# Patient Record
Sex: MALE | Race: WHITE | NOT HISPANIC OR LATINO | Employment: FULL TIME | URBAN - METROPOLITAN AREA
[De-identification: names, ages, dates, MRNs, and addresses within clinical notes are randomized per-mention and may not be internally consistent; named-entity substitution may affect disease eponyms.]

---

## 2023-07-17 ENCOUNTER — HOSPITAL ENCOUNTER (EMERGENCY)
Facility: MEDICAL CENTER | Age: 23
End: 2023-07-17
Attending: EMERGENCY MEDICINE

## 2023-07-17 VITALS
DIASTOLIC BLOOD PRESSURE: 59 MMHG | BODY MASS INDEX: 21.14 KG/M2 | HEIGHT: 75 IN | HEART RATE: 75 BPM | OXYGEN SATURATION: 96 % | RESPIRATION RATE: 20 BRPM | SYSTOLIC BLOOD PRESSURE: 112 MMHG | WEIGHT: 170 LBS | TEMPERATURE: 98 F

## 2023-07-17 DIAGNOSIS — R55 NEAR SYNCOPE: ICD-10-CM

## 2023-07-17 DIAGNOSIS — E87.6 HYPOKALEMIA: ICD-10-CM

## 2023-07-17 DIAGNOSIS — R42 LIGHTHEADEDNESS: ICD-10-CM

## 2023-07-17 DIAGNOSIS — E86.0 DEHYDRATION: ICD-10-CM

## 2023-07-17 LAB
ALBUMIN SERPL BCP-MCNC: 4.6 G/DL (ref 3.2–4.9)
ALBUMIN/GLOB SERPL: 2.3 G/DL
ALP SERPL-CCNC: 57 U/L (ref 30–99)
ALT SERPL-CCNC: 14 U/L (ref 2–50)
ANION GAP SERPL CALC-SCNC: 12 MMOL/L (ref 7–16)
AST SERPL-CCNC: 16 U/L (ref 12–45)
BASOPHILS # BLD AUTO: 0.5 % (ref 0–1.8)
BASOPHILS # BLD: 0.04 K/UL (ref 0–0.12)
BILIRUB SERPL-MCNC: 0.9 MG/DL (ref 0.1–1.5)
BUN SERPL-MCNC: 13 MG/DL (ref 8–22)
CALCIUM ALBUM COR SERPL-MCNC: 8.8 MG/DL (ref 8.5–10.5)
CALCIUM SERPL-MCNC: 9.3 MG/DL (ref 8.5–10.5)
CHLORIDE SERPL-SCNC: 105 MMOL/L (ref 96–112)
CO2 SERPL-SCNC: 22 MMOL/L (ref 20–33)
CREAT SERPL-MCNC: 1.13 MG/DL (ref 0.5–1.4)
EOSINOPHIL # BLD AUTO: 0.11 K/UL (ref 0–0.51)
EOSINOPHIL NFR BLD: 1.4 % (ref 0–6.9)
ERYTHROCYTE [DISTWIDTH] IN BLOOD BY AUTOMATED COUNT: 40.2 FL (ref 35.9–50)
GFR SERPLBLD CREATININE-BSD FMLA CKD-EPI: 93 ML/MIN/1.73 M 2
GLOBULIN SER CALC-MCNC: 2 G/DL (ref 1.9–3.5)
GLUCOSE SERPL-MCNC: 154 MG/DL (ref 65–99)
HCT VFR BLD AUTO: 40 % (ref 42–52)
HGB BLD-MCNC: 13.9 G/DL (ref 14–18)
IMM GRANULOCYTES # BLD AUTO: 0.02 K/UL (ref 0–0.11)
IMM GRANULOCYTES NFR BLD AUTO: 0.3 % (ref 0–0.9)
LYMPHOCYTES # BLD AUTO: 3.47 K/UL (ref 1–4.8)
LYMPHOCYTES NFR BLD: 44.6 % (ref 22–41)
MCH RBC QN AUTO: 29.6 PG (ref 27–33)
MCHC RBC AUTO-ENTMCNC: 34.8 G/DL (ref 32.3–36.5)
MCV RBC AUTO: 85.3 FL (ref 81.4–97.8)
MONOCYTES # BLD AUTO: 0.47 K/UL (ref 0–0.85)
MONOCYTES NFR BLD AUTO: 6 % (ref 0–13.4)
NEUTROPHILS # BLD AUTO: 3.67 K/UL (ref 1.82–7.42)
NEUTROPHILS NFR BLD: 47.2 % (ref 44–72)
NRBC # BLD AUTO: 0 K/UL
NRBC BLD-RTO: 0 /100 WBC (ref 0–0.2)
PLATELET # BLD AUTO: 189 K/UL (ref 164–446)
PMV BLD AUTO: 10.6 FL (ref 9–12.9)
POTASSIUM SERPL-SCNC: 3 MMOL/L (ref 3.6–5.5)
PROT SERPL-MCNC: 6.6 G/DL (ref 6–8.2)
RBC # BLD AUTO: 4.69 M/UL (ref 4.7–6.1)
SODIUM SERPL-SCNC: 139 MMOL/L (ref 135–145)
WBC # BLD AUTO: 7.8 K/UL (ref 4.8–10.8)

## 2023-07-17 PROCEDURE — A9270 NON-COVERED ITEM OR SERVICE: HCPCS | Mod: JZ | Performed by: EMERGENCY MEDICINE

## 2023-07-17 PROCEDURE — 85025 COMPLETE CBC W/AUTO DIFF WBC: CPT

## 2023-07-17 PROCEDURE — 36415 COLL VENOUS BLD VENIPUNCTURE: CPT

## 2023-07-17 PROCEDURE — 700105 HCHG RX REV CODE 258: Performed by: EMERGENCY MEDICINE

## 2023-07-17 PROCEDURE — 99284 EMERGENCY DEPT VISIT MOD MDM: CPT

## 2023-07-17 PROCEDURE — 80053 COMPREHEN METABOLIC PANEL: CPT

## 2023-07-17 PROCEDURE — 700102 HCHG RX REV CODE 250 W/ 637 OVERRIDE(OP): Mod: JZ | Performed by: EMERGENCY MEDICINE

## 2023-07-17 RX ORDER — SODIUM CHLORIDE 9 MG/ML
1000 INJECTION, SOLUTION INTRAVENOUS ONCE
Status: COMPLETED | OUTPATIENT
Start: 2023-07-17 | End: 2023-07-17

## 2023-07-17 RX ORDER — POTASSIUM CHLORIDE 20 MEQ/1
40 TABLET, EXTENDED RELEASE ORAL ONCE
Status: COMPLETED | OUTPATIENT
Start: 2023-07-17 | End: 2023-07-17

## 2023-07-17 RX ORDER — SODIUM CHLORIDE 9 MG/ML
INJECTION, SOLUTION INTRAVENOUS CONTINUOUS
Status: DISCONTINUED | OUTPATIENT
Start: 2023-07-17 | End: 2023-07-17 | Stop reason: HOSPADM

## 2023-07-17 RX ADMIN — SODIUM CHLORIDE 1000 ML: 9 INJECTION, SOLUTION INTRAVENOUS at 02:14

## 2023-07-17 RX ADMIN — POTASSIUM CHLORIDE 40 MEQ: 1500 TABLET, EXTENDED RELEASE ORAL at 03:40

## 2023-07-17 RX ADMIN — SODIUM CHLORIDE: 9 INJECTION, SOLUTION INTRAVENOUS at 02:14

## 2023-07-17 NOTE — ED TRIAGE NOTES
"Chief Complaint   Patient presents with    Near Syncopal     Pt BIB EMS from [facility] for near syncopal episode. Pt endorses checking into room when he became diaphoretic, dizzy and had near syncopal episode. When EMS arrivaed on scene, pt was diaphoretic, hypotensive and bradycardic. Pt tx with 250 NS bolus en route. Pt presents AOX4, VSS. Pt endorses feeling \"much better, but hands are numb.\"  \"     Blood Pressure: 126/72, Pulse: 70, Respiration: 18, Temperature: 36.2 °C (97.1 °F), Height: 190.5 cm (6' 3\"), Weight: 77.1 kg (170 lb), BMI (Calculated): 21.25, BSA (Calculated): 2, Pulse Oximetry: 100 %, O2 Delivery Device: None - Room Air    "

## 2023-07-17 NOTE — DISCHARGE INSTRUCTIONS
Make sure that you stay very well-hydrated especially during the summer months.  You need to drink 1/2 to 1 gallon of water per day.  Make sure that you eat at least 3 times a day even if you keep protein snacks with you when you are busy working.  Follow-up with your primary care provider when you return home.  All of your laboratories and testing here tonight were negative excluding revealing you were dehydrated and your potassium was low.  You need to eat foods high in potassium i.e. bananas, dried apricots, kiwi fruit or look up foods high in potassium on the Internet and adjust your diet accordingly.

## 2023-07-17 NOTE — ED NOTES
Break RN:  Patient given warm meal, tolerated well.  IVF started, patient resting comfortably, NAD, denies additional needs at this time.

## 2023-07-17 NOTE — ED PROVIDER NOTES
"ER Provider Note    Scribed for Dr. Paola Cordova D.O. by Nain Austin. 7/17/2023  1:36 AM    Primary Care Provider: No primary care provider noted.    CHIEF COMPLAINT  Chief Complaint   Patient presents with    Near Syncopal     Pt BIB EMS from [facility] for near syncopal episode. Pt endorses checking into room when he became diaphoretic, dizzy and had near syncopal episode. When EMS arrivaed on scene, pt was diaphoretic, hypotensive and bradycardic. Pt tx with 250 NS bolus en route. Pt presents AOX4, VSS. Pt endorses feeling \"much better, but hands are numb.\"  \"       EXTERNAL RECORDS REVIEWED  No pertinent record reviewed.     HPI/ROS  LIMITATION TO HISTORY   Select: : None    OUTSIDE HISTORIAN(S):  None    Ravi Blakely is a 23 y.o. male who presents to the ED  via EMS for near syncope onset prior to arrival. He reports he normally lives in [place] but was in [place] for business and just took a flight to [facility] for a business trip.  He was checking into his hotel room at the [facility] and felt like he was going to pass out.  He was shaking from out of nowhere and laid down on the marble floor. He states that this has never happened before. He adds that he last ate this morning. He reports his dad is diabetic. No alleviating or exacerbating factors reported.    Skin pale no rashes    PAST MEDICAL HISTORY  History reviewed. No pertinent past medical history.    SURGICAL HISTORY  No past surgical history noted.    FAMILY HISTORY  He reports dad is diabetic.    SOCIAL HISTORY       CURRENT MEDICATIONS  No current outpatient medications    ALLERGIES  Dust mite extract and Cat hair extract    PHYSICAL EXAM  /72   Pulse 70   Temp 36.2 °C (97.1 °F) (Temporal)   Resp 18   Ht 1.905 m (6' 3\")   Wt 77.1 kg (170 lb)   SpO2 100%   BMI 21.25 kg/m²   Constitutional: Patient is well developed, well nourished. Non-toxic appearing.  Skin is pale  HENT: Normocephalic, atraumatic.  Dry oral mucosa.  Cardiovascular: " Normal heart rate and Regular rhythm. No murmur,  Thorax & Lungs: Clear and equal breath sounds with good excursion. No respiratory distress, no rhonchi, wheezing or rales.  Abdomen: Bowel sounds normal in all four quadrants. Soft,nontender  Skin: Warm, Dry,  Pale.   Extremities: Peripheral pulses 4/4 No edema, No tenderness,    Neurologic: Alert & oriented x 3, Normal motor function, Normal sensory function,  Psychiatric: Affect normal, Judgment normal, Mood normal.     DIAGNOSTIC STUDIES & PROCEDURES    Labs:   No results found for this or any previous visit.   Lab was on downtime but his CBC and CMP came back unremarkable other than a potassium of 3.0.  All labs reviewed by me.    COURSE & MEDICAL DECISION MAKING    ED Observation Status? Yes; I am placing the patient in to an observation status due to a diagnostic uncertainty as well as therapeutic intensity. Patient placed in observation status at 1:47 AM, 7/17/2023.     Observation plan is as follows: serum studies    Upon Reevaluation, the patient's condition has: Improved; and will be discharged.    Patient discharged from ED Observation status at 03:30 (Time) 7/17/23 (Date).     INITIAL ASSESSMENT AND PLAN  Care Narrative:       1:36 AM - Patient seen and evaluated at bedside. Discussed plan of care, including serum studies. Patient agrees to plan of care. Patient will be treated with IV fluids. Ordered CMP and CBC w/ diff to evaluate. Differential diagnoses include but are not limited to: Dehydration, electrolyte abnormality    2:36 AM Patient was reevaluated at bedside. Patient reports to feeling better.    HYDRATION: Based on the patient's presentation of Dehydration, Hypotension, and Other lightheadedness the patient was given IV fluids. IV Hydration was used because oral hydration was not as rapid as required. Upon recheck following hydration, the patient was markedly improved.                   DISPOSITION AND DISCUSSIONS  I have discussed management  of the patient with the following physicians and DENIZ's: none    Discussion of management with other Q or appropriate source(s): None     Escalation of care considered, and ultimately not performed: acute inpatient care management, however at this time, the patient is most appropriate for outpatient management.    Barriers to care at this time, including but not limited to: Patient does not have established PCP.     Decision tools and prescription drugs considered including, but not limited to:  Zofran as needed, he needs to eat foods high in potassium, hydrate himself and eat protein at least 3 times a day. .        FINAL IMPRESSION   1. Near syncope    2. Dehydration    3. Lightheadedness    4. Hypokalemia         Nain STRINGER (Daphnie), am scribing for, and in the presence of, Paola Cordova D.O..    Electronically signed by: Nain Austin (Daphnie), 7/17/2023    Paola STRINGER D.O. personally performed the services described in this documentation, as scribed by Nain Austin in my presence, and it is both accurate and complete.    The note accurately reflects work and decisions made by me.  Paola Cordova D.O.  7/17/2023  4:29 AM